# Patient Record
Sex: FEMALE | Race: WHITE | NOT HISPANIC OR LATINO | Employment: FULL TIME | URBAN - METROPOLITAN AREA
[De-identification: names, ages, dates, MRNs, and addresses within clinical notes are randomized per-mention and may not be internally consistent; named-entity substitution may affect disease eponyms.]

---

## 2023-09-23 ENCOUNTER — HOSPITAL ENCOUNTER (EMERGENCY)
Facility: CLINIC | Age: 28
Discharge: HOME | End: 2023-09-23
Payer: COMMERCIAL

## 2023-09-23 ENCOUNTER — APPOINTMENT (OUTPATIENT)
Dept: GENERAL RADIOLOGY | Facility: CLINIC | Age: 28
End: 2023-09-23
Payer: COMMERCIAL

## 2023-09-23 VITALS
SYSTOLIC BLOOD PRESSURE: 120 MMHG | DIASTOLIC BLOOD PRESSURE: 67 MMHG | HEART RATE: 95 BPM | RESPIRATION RATE: 18 BRPM | OXYGEN SATURATION: 100 % | TEMPERATURE: 97.8 F

## 2023-09-23 DIAGNOSIS — Z23 ENCOUNTER FOR IMMUNIZATION: ICD-10-CM
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
## 2023-09-23 DIAGNOSIS — M79.642 PAIN OF LEFT HAND: ICD-10-CM

## 2023-09-23 DIAGNOSIS — T14.8XXA SKIN ABRASION: ICD-10-CM

## 2023-09-23 PROCEDURE — 90471 IMMUNIZATION ADMIN: CPT

## 2023-09-23 PROCEDURE — 99283 EMERGENCY DEPT VISIT LOW MDM: CPT | Mod: 25

## 2023-09-23 PROCEDURE — 250N000011 HC RX IP 250 OP 636

## 2023-09-23 PROCEDURE — 73130 X-RAY EXAM OF HAND: CPT | Mod: LT

## 2023-09-23 PROCEDURE — 90715 TDAP VACCINE 7 YRS/> IM: CPT

## 2023-09-23 RX ADMIN — CLOSTRIDIUM TETANI TOXOID ANTIGEN (FORMALDEHYDE INACTIVATED), CORYNEBACTERIUM DIPHTHERIAE TOXOID ANTIGEN (FORMALDEHYDE INACTIVATED), BORDETELLA PERTUSSIS TOXOID ANTIGEN (GLUTARALDEHYDE INACTIVATED), BORDETELLA PERTUSSIS FILAMENTOUS HEMAGGLUTININ ANTIGEN (FORMALDEHYDE INACTIVATED), BORDETELLA PERTUSSIS PERTACTIN ANTIGEN, AND BORDETELLA PERTUSSIS FIMBRIAE 2/3 ANTIGEN 0.5 ML: 5; 2; 2.5; 5; 3; 5 INJECTION, SUSPENSION INTRAMUSCULAR at 22:55

## 2023-09-23 ASSESSMENT — ACTIVITIES OF DAILY LIVING (ADL): ADLS_ACUITY_SCORE: 35

## 2023-09-24 NOTE — ED PROVIDER NOTES
History     Chief Complaint:  Laceration       HPI   Marge Ramos is a 28 year old female with no pertinent past medical history who presents after a fall earlier today and a laceration to her left hand. The patient states it was raining and she was running to her partner's car when she fell. She was holding a glass of sparkling water at the time and it broke, cutting her left hand.  She is wondering if maybe there could be glass in her hand.  She reports that her last tetanus shot was at least 8 years ago, possibly more.  She incurred no other injuries during this event.  She did not hit her head, lose consciousness or hurt her neck.    Independent Historian:   None - Patient Only    Review of External Notes:   None available    Medications:    The patient denies any current medications.    Past Medical History:    The patient denies any prior medical history.    Physical Exam   Patient Vitals for the past 24 hrs:   BP Temp Temp src Pulse Resp SpO2   09/23/23 2214 120/67 97.8  F (36.6  C) Temporal 95 18 100 %        Physical Exam  General: Adult female sitting in exam room holding gauze over her left hand.  HENT:   Head: Atraumatic.  Mouth/Throat: Oropharynx clear and moist.  Eyes: Conjunctive and EOM normal. PERRLA.  Neck: Normal ROM. No rigidity.  CV: Regular rate and rhythm.  Normal capillary refill in all digits of left hand.  Resp: Normal respiratory effort.   MSK: Patient walks without difficulty.  Moving all extremities without difficulty.  Skin: Patient has a couple abrasions on the palmar aspect of her left hand.  There are no obvious foreign bodies on inspection.  Wounds seem superficial and bleeding is controlled.  Neuro: Awake, alert, oriented x 3.  Sensation intact to light touch left hand.  Psych: Normal mood and affect.      Emergency Department Course       Imaging:  XR Hand Left G/E 3 Views   Final Result   IMPRESSION: No visible fracture, dislocation or opaque foreign body. The fourth and  fifth metacarpals are short compared to second and third presumably normal variant.         Report per radiology      Emergency Department Course & Assessments:       Interventions:  Medications   Tdap (tetanus-diphtheria-acell pertussis) (ADACEL) injection 0.5 mL (0.5 mLs Intramuscular $Given 9/23/23 9590)        Assessments:  2232 I entered the patient's room and obtained history.  2300   I rechecked the patient no fractures or foreign bodies on her x-ray.  The medical tech cleaned out her abrasions and dressed the wound with sterile dressing    Independent Interpretation (X-rays, CTs, rhythm strip):  No fractures or foreign body on XR      Social Determinants of Health affecting care:   Patient has no PCP established- needs a referral.    Disposition:  The patient was discharged to home.     Impression & Plan      Medical Decision Making:  Erin is a pleasant 28-year-old female who came into the emergency room for evaluation of some left hand pain after a fall per HPI above.  She did not hit her head, lose consciousness or hurt her neck.  On arrival she is GCS 15 with no neurologic deficits.  Vitals normal.  She had been holding a glass bottle and fell on her left hand.  She was concerned that potentially there could be glass in her hand.  Fortunately, x-ray imaging was done and negative for any foreign body and there was no evidence for fracture.  Wounds were copiously cleaned out.  These were fairly superficial and not anything requiring suturing.  Bacitracin and sterile dressings were placed.  Patient was told to return if she develops any spreading redness, purulent drainage, worsening pain or swelling.  She does not have an established primary care and asked for a referral for one, which I provided.  She will get a call from [facility].  Patient does not recall when her last tetanus was but states it was most likely 8 or more years ago.  We updated her here today and she understands she has no good  for 10 years.  Patient was discharged home in improved condition.    Diagnosis:    ICD-10-CM    1. Fall, initial encounter  W19.XXXA       2. Pain of left hand  M79.642 Primary Care Referral      3. Skin abrasion  T14.8XXA Primary Care Referral      4. Encounter for immunization  Z23     tetanus UTD 9/23/23             Scribe Disclosure:  Arik LUKE Hired, am serving as a scribe at 10:33 PM on 9/23/2023 to document services personally performed by Juana Whitt PA-C based on my observations and the provider's statements to me.   9/23/2023   Juana Whitt PA-C Dewing, Jennifer C, PA-C  09/23/23 4675

## 2023-09-24 NOTE — ED TRIAGE NOTES
Pt comes in for tripping and falling as she was holding a sparkling water and it cutting her L hand. Small amount of bleeding noted. Pt also scraped L knee. Did not hit her head     Triage Assessment       Row Name 09/23/23 7222       Triage Assessment (Adult)    Airway WDL WDL       Respiratory WDL    Respiratory WDL WDL       Skin Circulation/Temperature WDL    Skin Circulation/Temperature WDL WDL       Cardiac WDL    Cardiac WDL WDL       Peripheral/Neurovascular WDL    Peripheral Neurovascular WDL WDL       Cognitive/Neuro/Behavioral WDL    Cognitive/Neuro/Behavioral WDL WDL